# Patient Record
Sex: MALE | Race: WHITE | NOT HISPANIC OR LATINO | Employment: STUDENT | URBAN - METROPOLITAN AREA
[De-identification: names, ages, dates, MRNs, and addresses within clinical notes are randomized per-mention and may not be internally consistent; named-entity substitution may affect disease eponyms.]

---
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
## 2024-04-16 ENCOUNTER — OFFICE VISIT (OUTPATIENT)
Age: 19
End: 2024-04-16
Payer: COMMERCIAL

## 2024-04-16 VITALS
WEIGHT: 199 LBS | HEIGHT: 76 IN | HEART RATE: 74 BPM | DIASTOLIC BLOOD PRESSURE: 78 MMHG | BODY MASS INDEX: 24.23 KG/M2 | OXYGEN SATURATION: 97 % | SYSTOLIC BLOOD PRESSURE: 118 MMHG

## 2024-04-16 DIAGNOSIS — G47.00 INSOMNIA, UNSPECIFIED TYPE: Primary | ICD-10-CM

## 2024-04-16 DIAGNOSIS — F41.9 ANXIETY: ICD-10-CM

## 2024-04-16 DIAGNOSIS — R45.851 PASSIVE SUICIDAL IDEATIONS: ICD-10-CM

## 2024-04-16 DIAGNOSIS — F31.9 BIPOLAR 1 DISORDER: ICD-10-CM

## 2024-04-16 PROCEDURE — 99204 OFFICE O/P NEW MOD 45 MIN: CPT | Performed by: INTERNAL MEDICINE

## 2024-04-16 RX ORDER — BUSPIRONE HYDROCHLORIDE 7.5 MG/1
7.5 TABLET ORAL 2 TIMES DAILY
Qty: 60 TABLET | Refills: 1 | Status: SHIPPED | OUTPATIENT
Start: 2024-04-16

## 2024-04-16 RX ORDER — HYDROXYZINE HYDROCHLORIDE 25 MG/1
TABLET, FILM COATED ORAL
Qty: 60 TABLET | Refills: 1 | Status: SHIPPED | OUTPATIENT
Start: 2024-04-16

## 2024-04-16 NOTE — PROGRESS NOTES
New Patient Note    Boaz Carrasco is a 19 y.o. male.    Chief Complaint   Patient presents with    Anxiety    Depression    Establish Care       HPI    Anxiety  Depression  Bipolar  Insomnia  Passive SI  - longstanding concerns  - first realized that was an issue during COVID  - tried Trazodone and Buspar  - Vraylar managed by Psych NP, Gila, everyone around him seemed like it was helping but he did not personally noticed a change  - all three meds used at this time  - off meds 8-9 months  - more irritable  - closed off  - anytime someone tries to talk has outburst  - hard to talk to on meds    - reports cycling once a month to [place]  - lasts a week  - buys too much, stays up, excess energy, needs less sleep, flight of ideas, more talkative  - during down times feels low, anhedonia, poor appetite, poor concentration, poor sleep  - right now feels even  - interested in getting back into therapy  - interested in seeing Psych  - some passive SI, no plan, or plans to act on thoughts, no one he can call, he can call [phone] and familiar with that number      Past Medical History:  Patient Active Problem List   Diagnosis    Insomnia    Anxiety    Bipolar 1 disorder    Passive suicidal ideations        Medications:    Current Outpatient Medications:     Cariprazine HCl (Vraylar) 1.5 MG capsule capsule, Take 1 capsule by mouth Daily., Disp: 30 capsule, Rfl: 1    busPIRone (BUSPAR) 7.5 MG tablet, Take 1 tablet by mouth 2 (Two) Times a Day., Disp: 60 tablet, Rfl: 1    hydrOXYzine (ATARAX) 25 MG tablet, 1-2 tab po q8hr PRN sleep or anxiety, Disp: 60 tablet, Rfl: 1     Allergy to Medications:  No Known Allergies     Immunizations:  Immunization History   Administered Date(s) Administered    COVID-19 (MODERNA) BIVALENT 12+YRS 03/27/2023    COVID-19 (PFIZER) Purple Cap Monovalent 03/24/2021, 04/15/2021    DTaP 2004, 2004, 2004, 01/19/2006, 03/20/2008    Fluzone (or Fluarix & Flulaval for VFC) >6mos  "11/06/2020, 09/16/2022    HPV Quadrivalent 05/08/2015    Hep A, 2 Dose 07/06/2018, 03/18/2019    Hepatitis B Adult/Adolescent IM 2004, 2004, 2004    Hib (PRP-OMP) 2004, 2004, 2004, 01/19/2006    IPV 2004, 2004, 2004, 03/20/2008    MMR 01/09/2006, 03/20/2008    Meningococcal MCV4P (Menactra) 05/08/2015    PEDS-Pneumococcal Conjugate (PCV7) 2004, 2004, 2004, 03/16/2005    Tdap 05/08/2015    Varicella 03/16/2005, 01/05/2016       Surgeries:  Past Surgical History:   Procedure Laterality Date    EAR TUBES Bilateral         Family History:  Family History   Problem Relation Age of Onset    Anxiety disorder Mother     Asthma Mother     Mental illness Mother     Asthma Father     Depression Father     Mental illness Father     Alcohol abuse Paternal Grandfather     Breast cancer Other     Colon cancer Neg Hx     Prostate cancer Neg Hx     Heart attack Neg Hx     Stroke Neg Hx         Social:  Social History     Socioeconomic History    Marital status: Single   Tobacco Use    Smoking status: Never    Smokeless tobacco: Never   Vaping Use    Vaping status: Never Used   Substance and Sexual Activity    Alcohol use: Yes    Drug use: Not Currently     Types: Marijuana    Sexual activity: Yes     Partners: Female     Birth control/protection: Condom       Household:  Occupation:Manager at [employer]  Hobbies:  Exercise:      Objective   /78   Pulse 74   Ht 193 cm (75.98\")   Wt 90.3 kg (199 lb)   SpO2 97%   BMI 24.23 kg/m²   BMI is within normal parameters. No other follow-up for BMI required.       Physical Exam  Vitals reviewed.   Constitutional:       General: He is not in acute distress.     Appearance: Normal appearance.   HENT:      Nose: Nose normal.      Mouth/Throat:      Mouth: Mucous membranes are moist.   Eyes:      Conjunctiva/sclera: Conjunctivae normal.   Cardiovascular:      Rate and Rhythm: Normal rate and regular rhythm.      Heart " sounds: Normal heart sounds. No murmur heard.  Pulmonary:      Effort: Pulmonary effort is normal. No respiratory distress.      Breath sounds: Normal breath sounds.   Abdominal:      General: Abdomen is flat. Bowel sounds are normal. There is no distension.      Palpations: Abdomen is soft.      Tenderness: There is no abdominal tenderness.   Neurological:      Mental Status: He is alert.   Psychiatric:      Comments: Good insight           Assessment & Plan   1. Insomnia, unspecified type  - previously on Trazodone, will try Hydroxyzine  - hydrOXYzine (ATARAX) 25 MG tablet; 1-2 tab po q8hr PRN sleep or anxiety  Dispense: 60 tablet; Refill: 1    2. Anxiety  - will resume Buspar at 7.5mg BID    3. Bipolar 1 disorder  - started Vraylar 1.5mg  - previously did not increase to 3mg  - will FU in 1mo to assess response  - advised if mood worsens then call sooner and stop med  - will [facility] to [facility] for therapy and med management    4. Passive suicidal ideations  - contracts for safety, no plan, no plan to act on thoughts, can call [phone] if sx worsen     FU in 1mo for annual and mood    Health Care Maintenance:discuss next visit  HIV Screen:  Hep C Screen:  GC/Chlamydia Screen:    Colon Ca Screening: Start Screening at 46yo / Last Colonoscopy:  Result:  Mammogram: Start Screening at 41yo / Last mammogram:  Result:  Prostate Cancer Screening:  Lung Cancer: Positive smoking history screen at 51yo/Last result:    Immunizations:    DEXA:  AAA:    Lipids:  A1C:    Josie Chris MD, FAAP, FACP  Internal Medicine and Pediatrics  [facility]

## 2024-04-18 ENCOUNTER — PRIOR AUTHORIZATION (OUTPATIENT)
Age: 19
End: 2024-04-18
Payer: COMMERCIAL

## 2024-04-18 NOTE — TELEPHONE ENCOUNTER
Patient received med for $40. He would prefer to remain on Vraylar. If it is an issue with next fill, will readdress.

## 2024-04-18 NOTE — TELEPHONE ENCOUNTER
HARSH FARIAS (Lebron: [id number])  Vraylar 1.5MG capsules  Outcome: N/A  Created: April 16th, 2024    To initiate an authorization request for this medication, please contact Federal Employee Program (FEP) at [phone].    Called and ran prior auth over the phone. Vraylar is not under plan. They are going to fax denial letter with alternatives. They had given three alternatives over the phone    Aripiprazole tablet  Clozapine tablet  Lithium carbonate  tablet

## 2024-04-19 ENCOUNTER — PATIENT ROUNDING (BHMG ONLY) (OUTPATIENT)
Age: 19
End: 2024-04-19
Payer: COMMERCIAL

## 2024-04-19 NOTE — PROGRESS NOTES
Greetings!     This is Aisha and I am the manager at [facility].     I am reaching out to welcome you to our practice! I feel that patient feedback is crucial to ensuring that we provide the highest quality of care. I would like to take this opportunity to ask a few questions about your recent visit. This is not a requirement. However, participation is greatly appreciated!     Thinking about your recent visit with us, what things went well?     We strive to ensure that we protect your safety and privacy. Is there anything we could have done to improve this during your visit?     We are always looking for ways to make each patient's experience better. Do you have any suggestions on how we may improve?     Overall, were you satisfied with your first visit to our practice?     Do you have any questions regarding your visit?     Thank you for taking the time to answer these questions today. You may also receive a [employer] survey via standard mail or email regarding your recent [facility] experience. The [employer] survey lets us know how we are doing in comparison to other offices throughout the country. That feedback is collected to guide [facility] as a whole in updating policies, focus on areas of improvement and so much more! Once again, completing the [employer] survey is not a requirement. However, we at [facility] value your thoughts, feelings and opinions. Please feel free to contact our office at [phone] with any questions or concerns.          Thank you and have a wonderful day!

## 2024-04-30 ENCOUNTER — OFFICE VISIT (OUTPATIENT)
Age: 19
End: 2024-04-30
Payer: COMMERCIAL

## 2024-04-30 ENCOUNTER — PRIOR AUTHORIZATION (OUTPATIENT)
Age: 19
End: 2024-04-30

## 2024-04-30 VITALS
BODY MASS INDEX: 24.93 KG/M2 | SYSTOLIC BLOOD PRESSURE: 120 MMHG | DIASTOLIC BLOOD PRESSURE: 78 MMHG | OXYGEN SATURATION: 98 % | HEIGHT: 76 IN | WEIGHT: 204.7 LBS | HEART RATE: 94 BPM

## 2024-04-30 DIAGNOSIS — F31.9 BIPOLAR 1 DISORDER: Primary | ICD-10-CM

## 2024-04-30 DIAGNOSIS — F41.1 GENERALIZED ANXIETY DISORDER: ICD-10-CM

## 2024-04-30 DIAGNOSIS — Z51.81 ENCOUNTER FOR THERAPEUTIC DRUG MONITORING: ICD-10-CM

## 2024-04-30 PROCEDURE — 90792 PSYCH DIAG EVAL W/MED SRVCS: CPT | Performed by: STUDENT IN AN ORGANIZED HEALTH CARE EDUCATION/TRAINING PROGRAM

## 2024-04-30 RX ORDER — CLONIDINE HYDROCHLORIDE 0.1 MG/1
0.1 TABLET ORAL EVERY EVENING
Qty: 30 TABLET | Refills: 0 | Status: SHIPPED | OUTPATIENT
Start: 2024-04-30

## 2024-04-30 RX ORDER — BUSPIRONE HYDROCHLORIDE 10 MG/1
10 TABLET ORAL 2 TIMES DAILY
Qty: 60 TABLET | Refills: 2 | Status: SHIPPED | OUTPATIENT
Start: 2024-04-30
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
## 2024-04-30 NOTE — TELEPHONE ENCOUNTER
Vraylar 3MG capsules  PA started     Prior Authorization submitted through CoverMyMeds    Key: [id number]

## 2024-05-02 NOTE — TELEPHONE ENCOUNTER
Called Federal Employee Program (FEP) at [phone] to initiate a prior authorization. Per PT rep the Pts ins formulary does not cover Vraylar 3 mg tablets. PT rep stated she will fax a list of formulary alternatives to the clinic.

## 2024-05-05 LAB
1OH-MIDAZOLAM UR QL SCN: NOT DETECTED NG/MG CREAT
6MAM UR QL SCN: NEGATIVE NG/ML
7AMINOCLONAZEPAM/CREAT UR: NOT DETECTED NG/MG CREAT
A-OH ALPRAZ/CREAT UR: NOT DETECTED NG/MG CREAT
A-OH-TRIAZOLAM/CREAT UR CFM: NOT DETECTED NG/MG CREAT
ACP UR QL CFM: NOT DETECTED
ALPRAZ/CREAT UR CFM: NOT DETECTED NG/MG CREAT
AMPHETAMINES UR QL SCN: NEGATIVE NG/ML
APAP UR QL SCN: NEGATIVE UG/ML
BARBITURATES UR QL SCN: NEGATIVE NG/ML
BENZODIAZ SCN METH UR: NEGATIVE
BUPRENORPHINE UR QL SCN: NEGATIVE
BUPRENORPHINE/CREAT UR: NOT DETECTED NG/MG CREAT
CANNABINOIDS UR QL SCN: NEGATIVE NG/ML
CARISOPRODOL UR QL: NEGATIVE NG/ML
CLONAZEPAM/CREAT UR CFM: NOT DETECTED NG/MG CREAT
COCAINE+BZE UR QL SCN: NEGATIVE NG/ML
CREAT UR-MCNC: 533 MG/DL
D-METHORPHAN UR-MCNC: NOT DETECTED NG/ML
D-METHORPHAN+LEVORPHANOL UR QL: NOT DETECTED
DESALKYLFLURAZ/CREAT UR: NOT DETECTED NG/MG CREAT
DIAZEPAM/CREAT UR: NOT DETECTED NG/MG CREAT
ETHANOL UR QL SCN: NEGATIVE G/DL
ETHANOL UR QL SCN: NEGATIVE NG/ML
FENTANYL CTO UR SCN-MCNC: NEGATIVE NG/ML
FENTANYL/CREAT UR: NOT DETECTED NG/MG CREAT
FLUNITRAZEPAM UR QL SCN: NOT DETECTED NG/MG CREAT
GABAPENTIN UR-MCNC: NEGATIVE UG/ML
HYPNOTICS UR QL SCN: NEGATIVE
KETAMINE UR QL: NOT DETECTED
LORAZEPAM/CREAT UR: NOT DETECTED NG/MG CREAT
MEPERIDINE UR QL SCN: NEGATIVE NG/ML
METHADONE UR QL SCN: NEGATIVE NG/ML
METHADONE+METAB UR QL SCN: NEGATIVE NG/ML
MIDAZOLAM/CREAT UR CFM: NOT DETECTED NG/MG CREAT
MISCELLANEOUS, UR: NEGATIVE
NORBUPRENORPHINE/CREAT UR: NOT DETECTED NG/MG CREAT
NORDIAZEPAM/CREAT UR: NOT DETECTED NG/MG CREAT
NORFENTANYL/CREAT UR: NOT DETECTED NG/MG CREAT
NORFLUNITRAZEPAM UR-MCNC: NOT DETECTED NG/MG CREAT
NORKETAMINE UR-MCNC: NOT DETECTED UG/ML
OPIATES UR SCN-MCNC: NEGATIVE NG/ML
OTHER HALLUCINOGENS UR: NEGATIVE
OXAZEPAM/CREAT UR: NOT DETECTED NG/MG CREAT
OXYCODONE CTO UR SCN-MCNC: NEGATIVE NG/ML
PCP UR QL SCN: NEGATIVE NG/ML
PRESCRIBED MEDICATIONS: NORMAL
PROPOXYPH UR QL SCN: NEGATIVE NG/ML
TAPENTADOL CTO UR SCN-MCNC: NEGATIVE NG/ML
TEMAZEPAM/CREAT UR: NOT DETECTED NG/MG CREAT
TRAMADOL UR QL SCN: NEGATIVE NG/ML
ZALEPLON UR-MCNC: NOT DETECTED NG/ML
ZOLPIDEM PHENYL-4-CARB UR QL SCN: NOT DETECTED
ZOLPIDEM UR QL SCN: NOT DETECTED
ZOPICLONE-N-OXIDE UR-MCNC: NOT DETECTED NG/ML

## 2024-05-06 NOTE — TELEPHONE ENCOUNTER
Called NA LVM informing PT to call back to discuss Vraylar Rx and relay providers message;    Can you inform him that insurance will not cover? We can try adding another medication to help the vraylar 1.5 mg work better, or talk about changing medications at our next visit. If he needs to move appointment earlier he can. Also, please let me know if he needs a refill of 1.5 mg tablets of vraylar     PT may be paying out of pocket for Vraylar Rx.

## 2024-05-28 ENCOUNTER — OFFICE VISIT (OUTPATIENT)
Age: 19
End: 2024-05-28
Payer: COMMERCIAL

## 2024-05-28 VITALS
HEART RATE: 84 BPM | BODY MASS INDEX: 24.72 KG/M2 | HEIGHT: 76 IN | SYSTOLIC BLOOD PRESSURE: 120 MMHG | WEIGHT: 203 LBS | DIASTOLIC BLOOD PRESSURE: 80 MMHG | OXYGEN SATURATION: 98 %

## 2024-05-28 DIAGNOSIS — M54.2 CERVICAL MUSCLE PAIN: ICD-10-CM

## 2024-05-28 DIAGNOSIS — M54.50 CHRONIC BILATERAL LOW BACK PAIN WITHOUT SCIATICA: Primary | ICD-10-CM

## 2024-05-28 DIAGNOSIS — G43.009 MIGRAINE WITHOUT AURA AND WITHOUT STATUS MIGRAINOSUS, NOT INTRACTABLE: ICD-10-CM

## 2024-05-28 DIAGNOSIS — G89.29 CHRONIC BILATERAL LOW BACK PAIN WITHOUT SCIATICA: Primary | ICD-10-CM

## 2024-05-28 PROCEDURE — 99213 OFFICE O/P EST LOW 20 MIN: CPT | Performed by: INTERNAL MEDICINE

## 2024-05-29 NOTE — PROGRESS NOTES
Progress Note    Subjective      Danilo is a 19 y.o. male.    Chief Complaint   Patient presents with    Joint Pain     In arms and back.     Back Pain     Been hurting for a few years.        Joint Pain  Associated symptoms include arthralgias.   Back Pain        Noted some pain in neck and lumbar spine  Notes pain is in paraspinal muscles of lumbar spine  Notes pain in neck at paraspinal muscles  Some pain over cervical spine at base  Patient notes having scoliosis films in the past that were neg per patient and musculature were not equal on both sides  No trauma  Feels stiff in back soemtimes  No other joints implicated  No red hot or tender joints  Initially described some pain over triceps but later in visit said that pain mostly confined to back  No sensation change  No weakness  No trauma  No heavy lifting home or work    Past Medical History:  Patient Active Problem List   Diagnosis    Insomnia    Anxiety    Bipolar 1 disorder    Passive suicidal ideations       Medications:  Current Outpatient Medications on File Prior to Visit   Medication Sig Dispense Refill    busPIRone (BUSPAR) 10 MG tablet Take 1 tablet by mouth 2 (Two) Times a Day. 60 tablet 2    Cariprazine HCl (Vraylar) 3 MG capsule capsule Take 1 capsule by mouth Daily. 14 capsule 0    Cariprazine HCl (Vraylar) 3 MG capsule capsule Take 1 capsule by mouth Daily. 30 capsule 0    cloNIDine (Catapres) 0.1 MG tablet Take 1 tablet by mouth Every Evening. 30 tablet 0     No current facility-administered medications on file prior to visit.       Allergies:   No Known Allergies    Immunizations:  Immunization History   Administered Date(s) Administered    COVID-19 (MODERNA) BIVALENT 12+YRS 03/27/2023    COVID-19 (PFIZER) Purple Cap Monovalent 03/24/2021, 04/15/2021    DTaP 2004, 2004, 2004, 01/19/2006, 03/20/2008    Fluzone (or Fluarix & Flulaval for VFC) >6mos 11/06/2020, 09/16/2022    HPV Quadrivalent 05/08/2015    Hep A, 2 Dose  "07/06/2018, 03/18/2019    Hepatitis B Adult/Adolescent IM 2004, 2004, 2004    Hib (PRP-OMP) 2004, 2004, 2004, 01/19/2006    IPV 2004, 2004, 2004, 03/20/2008    MMR 01/09/2006, 03/20/2008    Meningococcal MCV4P (Menactra) 05/08/2015    PEDS-Pneumococcal Conjugate (PCV7) 2004, 2004, 2004, 03/16/2005    Tdap 05/08/2015    Varicella 03/16/2005, 01/05/2016        Family History:  Family History   Problem Relation Age of Onset    Anxiety disorder Mother     Asthma Mother     Mental illness Mother     Asthma Father     Depression Father     Mental illness Father     Alcohol abuse Paternal Grandfather     Breast cancer Other     Colon cancer Neg Hx     Prostate cancer Neg Hx     Heart attack Neg Hx     Stroke Neg Hx        Social History:  Social History     Socioeconomic History    Marital status: Single   Tobacco Use    Smoking status: Never    Smokeless tobacco: Never   Vaping Use    Vaping status: Never Used   Substance and Sexual Activity    Alcohol use: Yes    Drug use: Not Currently     Types: Marijuana    Sexual activity: Yes     Partners: Female     Birth control/protection: Condom       Objective   /80   Pulse 84   Ht 193 cm (75.98\")   Wt 92.1 kg (203 lb)   SpO2 98%   BMI 24.72 kg/m²     BMI is within normal parameters. No other follow-up for BMI required.       Physical Exam  Vitals reviewed.   HENT:      Mouth/Throat:      Mouth: Mucous membranes are moist.   Eyes:      Conjunctiva/sclera: Conjunctivae normal.   Pulmonary:      Effort: Pulmonary effort is normal.   Musculoskeletal:      Comments: TTP over base of cervical spine, TTP over cervical paraspinal muscles  No TTP over lumbar spine, TTP over lumbar paraspinal muscles  FROM in neck and lumbar spine  Normal gait   Neurological:      Mental Status: He is alert.         Assessment & Plan   1. Chronic bilateral low back pain without sciatica  2. Cervical muscle pain  - New and " uncontrolled  - Ambulatory Referral to Physical Therapy for Evaluation & Treatment  - advised to start Ibuprofen 600mg q6 PRN  - if PT does not resolve symptoms, will consider additional workup for AS    3. Migraines  - discussed ok to use NSAID, reason counseled by previous provider about being cautioned with migraines is due to medication overuse HA if used over 15 days of the month    FU in 6-8 weeks      Josie Chris MD, FAAP, FACP  Internal Medicine and Pediatrics  [facility] [place]

## 2024-07-15 ENCOUNTER — OFFICE VISIT (OUTPATIENT)
Age: 19
End: 2024-07-15
Payer: COMMERCIAL

## 2024-07-15 VITALS
WEIGHT: 194.3 LBS | OXYGEN SATURATION: 98 % | DIASTOLIC BLOOD PRESSURE: 90 MMHG | BODY MASS INDEX: 23.66 KG/M2 | HEIGHT: 76 IN | HEART RATE: 87 BPM | SYSTOLIC BLOOD PRESSURE: 126 MMHG

## 2024-07-15 DIAGNOSIS — G89.29 CHRONIC BILATERAL LOW BACK PAIN WITHOUT SCIATICA: ICD-10-CM

## 2024-07-15 DIAGNOSIS — M54.50 CHRONIC BILATERAL LOW BACK PAIN WITHOUT SCIATICA: ICD-10-CM

## 2024-07-15 DIAGNOSIS — G43.009 MIGRAINE WITHOUT AURA AND WITHOUT STATUS MIGRAINOSUS, NOT INTRACTABLE: Primary | ICD-10-CM

## 2024-07-15 DIAGNOSIS — M54.2 CERVICAL MUSCLE PAIN: ICD-10-CM

## 2024-07-15 PROCEDURE — 99213 OFFICE O/P EST LOW 20 MIN: CPT | Performed by: INTERNAL MEDICINE

## 2024-07-15 NOTE — PROGRESS NOTES
Progress Note    Subjective      Danilo is a 19 y.o. male.    Chief Complaint   Patient presents with    Back Pain     F/u       Back Pain        Today:  Continuing to have back pain  No radicular sx  Ibuprofen has not made a big change  Has not started PT yet    Last Visit:   Noted some pain in neck and lumbar spine  Notes pain is in paraspinal muscles of lumbar spine  Notes pain in neck at paraspinal muscles  Some pain over cervical spine at base  Patient notes having scoliosis films in the past that were neg per patient and musculature were not equal on both sides  No trauma  Feels stiff in back soemtimes  No other joints implicated  No red hot or tender joints  Initially described some pain over triceps but later in visit said that pain mostly confined to back  No sensation change  No weakness  No trauma  No heavy lifting home or work     Past Medical History:  Patient Active Problem List   Diagnosis    Insomnia    Anxiety    Bipolar 1 disorder    Passive suicidal ideations    Migraine without aura and without status migrainosus, not intractable       Medications:  Current Outpatient Medications on File Prior to Visit   Medication Sig Dispense Refill    busPIRone (BUSPAR) 10 MG tablet Take 1 tablet by mouth 2 (Two) Times a Day. (Patient not taking: Reported on 7/15/2024) 60 tablet 2    Cariprazine HCl (Vraylar) 3 MG capsule capsule Take 1 capsule by mouth Daily. (Patient not taking: Reported on 7/15/2024) 30 capsule 0    cloNIDine (Catapres) 0.1 MG tablet Take 1 tablet by mouth Every Evening. (Patient not taking: Reported on 7/15/2024) 30 tablet 0     No current facility-administered medications on file prior to visit.       Allergies:   No Known Allergies    Immunizations:  Immunization History   Administered Date(s) Administered    COVID-19 (MODERNA) BIVALENT 12+YRS 03/27/2023    COVID-19 (PFIZER) Purple Cap Monovalent 03/24/2021, 04/15/2021    DTaP 2004, 2004, 2004, 01/19/2006, 03/20/2008     "Fluzone (or Fluarix & Flulaval for VFC) >6mos 11/06/2020, 09/16/2022    HPV Quadrivalent 05/08/2015    Hep A, 2 Dose 07/06/2018, 03/18/2019    Hepatitis B Adult/Adolescent IM 2004, 2004, 2004    Hib (PRP-OMP) 2004, 2004, 2004, 01/19/2006    IPV 2004, 2004, 2004, 03/20/2008    MMR 01/09/2006, 03/20/2008    Meningococcal MCV4P (Menactra) 05/08/2015    PEDS-Pneumococcal Conjugate (PCV7) 2004, 2004, 2004, 03/16/2005    Tdap 05/08/2015    Varicella 03/16/2005, 01/05/2016        Family History:  Family History   Problem Relation Age of Onset    Anxiety disorder Mother     Asthma Mother     Mental illness Mother     Asthma Father     Depression Father     Mental illness Father     Alcohol abuse Paternal Grandfather     Breast cancer Other     Colon cancer Neg Hx     Prostate cancer Neg Hx     Heart attack Neg Hx     Stroke Neg Hx        Social History:  Social History     Socioeconomic History    Marital status: Single   Tobacco Use    Smoking status: Never     Passive exposure: Never    Smokeless tobacco: Never   Vaping Use    Vaping status: Never Used   Substance and Sexual Activity    Alcohol use: Yes    Drug use: Not Currently     Types: Marijuana    Sexual activity: Yes     Partners: Female     Birth control/protection: Condom       Objective   /90 (BP Location: Left arm, Patient Position: Sitting, Cuff Size: Adult)   Pulse 87   Ht 193 cm (75.98\")   Wt 88.1 kg (194 lb 4.8 oz)   SpO2 98%   BMI 23.66 kg/m²     BMI is within normal parameters. No other follow-up for BMI required.       Physical Exam  Vitals reviewed.   Constitutional:       General: He is not in acute distress.  HENT:      Mouth/Throat:      Mouth: Mucous membranes are moist.   Eyes:      Conjunctiva/sclera: Conjunctivae normal.   Pulmonary:      Effort: Pulmonary effort is normal.   Musculoskeletal:      Comments: TTP over the lumbar paraspinal muscles. FROM but some pain " in all directions. No TTP over lumbar spine.    Neurological:      Mental Status: He is alert.         Assessment & Plan       1. Chronic bilateral low back pain without sciatica  2. Cervical muscle pain  - unchanged with Ibuprofen  - Ambulatory Referral to Physical Therapy for Evaluation & Treatment made last appt but has not started, missed appt, but planning to reschedule  - continue Ibuprofen 600mg q6 PRN  - if PT does not resolve symptoms, will consider additional workup for AS     3. Migraines  - improved  - discussed ok to use NSAID, reason counseled by previous provider about being cautioned with migraines is due to medication overuse HA if used over 15 days of the month     FU in 6-8 weeks    Josie Chris MD, FAAP, FACP  Internal Medicine and Pediatrics  [facility]